# Patient Record
Sex: FEMALE | Race: WHITE | NOT HISPANIC OR LATINO | URBAN - METROPOLITAN AREA
[De-identification: names, ages, dates, MRNs, and addresses within clinical notes are randomized per-mention and may not be internally consistent; named-entity substitution may affect disease eponyms.]

---

## 2017-06-19 ENCOUNTER — HOSPITAL ENCOUNTER (EMERGENCY)
Facility: HOSPITAL | Age: 42
Discharge: LEFT BEFORE DISCHARGE | End: 2017-06-19
Attending: EMERGENCY MEDICINE | Admitting: EMERGENCY MEDICINE

## 2017-06-19 VITALS
OXYGEN SATURATION: 99 % | RESPIRATION RATE: 18 BRPM | BODY MASS INDEX: 18.22 KG/M2 | HEART RATE: 104 BPM | WEIGHT: 99 LBS | TEMPERATURE: 97.4 F | HEIGHT: 62 IN | SYSTOLIC BLOOD PRESSURE: 98 MMHG | DIASTOLIC BLOOD PRESSURE: 40 MMHG

## 2017-06-19 DIAGNOSIS — R10.9 ABDOMINAL PAIN, UNSPECIFIED LOCATION: Primary | ICD-10-CM

## 2017-06-19 PROCEDURE — 99281 EMR DPT VST MAYX REQ PHY/QHP: CPT

## 2017-06-19 PROCEDURE — 99282 EMERGENCY DEPT VISIT SF MDM: CPT | Performed by: EMERGENCY MEDICINE

## 2017-06-19 RX ORDER — IBUPROFEN 800 MG/1
800 TABLET ORAL EVERY 6 HOURS PRN
COMMUNITY

## 2017-06-19 RX ORDER — DOXYCYCLINE HYCLATE 100 MG/1
100 CAPSULE ORAL 2 TIMES DAILY
COMMUNITY

## 2017-06-19 RX ORDER — PANTOPRAZOLE SODIUM 40 MG/1
40 TABLET, DELAYED RELEASE ORAL DAILY
COMMUNITY

## 2017-06-19 RX ORDER — CYCLOBENZAPRINE HCL 5 MG
10 TABLET ORAL 3 TIMES DAILY PRN
COMMUNITY

## 2017-06-19 RX ORDER — PROMETHAZINE HYDROCHLORIDE 12.5 MG/1
25 TABLET ORAL EVERY 6 HOURS PRN
COMMUNITY

## 2017-06-19 RX ORDER — MONTELUKAST SODIUM 10 MG/1
10 TABLET ORAL NIGHTLY
COMMUNITY

## 2017-06-19 RX ORDER — CLORAZEPATE DIPOTASSIUM 15 MG/1
15 TABLET ORAL 2 TIMES DAILY
COMMUNITY

## 2017-06-19 NOTE — ED PROVIDER NOTES
Subjective   History of Present Illness  History of Present Illness    Chief complaint: Lower abdominal pain    Location: Right side of abdomen    Quality/Severity:  Mild to moderate, worse when she stands up    Timing/Onset/Duration: It started on June 17, 2017 after a flight here.    Modifying Factors: It hurts to stand, feels better to remain still    Associated Symptoms: Fever or chills.  No nausea or vomiting.  The patient has no other complaints.  No headache.  No cough sore throat earache or nasal congestion.  No chest pain or shortness of breath.  No diarrhea or burning when she urinates.  The patient has been having normal bowel movements.    Narrative: This 41-year-old white female had a vaginal delivery 3 weeks ago.  Right ear by plane on June 17, 2017.  After that she noted that when she stood up she has pain in the right lower abdomen that was worse on standing.  The patient denies any fever or chills.  No nausea or vomiting.  The patient has been having normal bowel movements.    PCP:  No Local Provider      Review of Systems   Constitutional: Negative for chills and fever.   HENT: Negative for congestion, ear pain and sore throat.    Respiratory: Negative for cough, chest tightness, shortness of breath and wheezing.    Cardiovascular: Negative for chest pain, palpitations and leg swelling.   Gastrointestinal: Positive for abdominal pain. Negative for blood in stool, constipation, diarrhea, nausea and vomiting.   Genitourinary: Negative for decreased urine volume, dysuria, flank pain, frequency, genital sores, hematuria, menstrual problem, pelvic pain, urgency, vaginal bleeding, vaginal discharge and vaginal pain.   Musculoskeletal: Negative for back pain.   Skin: Negative for rash and wound.   Neurological: Negative for weakness and headaches.   Hematological: Negative for adenopathy.   Psychiatric/Behavioral: Negative for confusion.        Medication List      ASK your doctor about these medications           AMBIEN PO       clorazepate 15 MG tablet   Commonly known as:  TRANXENE       docusate sodium 50 MG capsule   Commonly known as:  COLACE       doxycycline 100 MG capsule   Commonly known as:  VIBRAMYCIN       FLEXERIL 5 MG tablet   Generic drug:  cyclobenzaprine       * ibuprofen 100 MG/5ML suspension   Commonly known as:  ADVIL,MOTRIN       * ibuprofen 800 MG tablet   Commonly known as:  ADVIL,MOTRIN       montelukast 10 MG tablet   Commonly known as:  SINGULAIR       pantoprazole 40 MG EC tablet   Commonly known as:  PROTONIX       promethazine 12.5 MG tablet   Commonly known as:  PHENERGAN       VYVANSE 30 MG capsule   Generic drug:  lisdexamfetamine       * Notice:  This list has 2 medication(s) that are the same as other   medications prescribed for you. Read the directions carefully, and ask   your doctor or other care provider to review them with you.        Past Medical History:   Diagnosis Date   • Anxiety    • Attention deficit hyperactivity disorder (ADHD)    • Obsessive compulsive disorder        Allergies   Allergen Reactions   • Sulfa Antibiotics        Past Surgical History:   Procedure Laterality Date   • CERVICAL SPINE SURGERY     •  SECTION     • FINGER SURGERY     • HYSTERECTOMY         History reviewed. No pertinent family history.    Social History     Social History   • Marital status: Single     Spouse name: N/A   • Number of children: N/A   • Years of education: N/A     Social History Main Topics   • Smoking status: Current Every Day Smoker     Packs/day: 0.50     Types: Cigarettes   • Smokeless tobacco: None   • Alcohol use Yes   • Drug use: No   • Sexual activity: Not Asked     Other Topics Concern   • None     Social History Narrative   • None           Objective   Physical Exam   Constitutional: She is oriented to person, place, and time. She appears well-developed and well-nourished. No distress.   ED Triage Vitals:  Temp: 97.4 °F (36.3 °C) (17 1125)  Heart Rate:  104 (06/19/17 1125)  Resp: 18 (06/19/17 1125)  BP: 98/40 (06/19/17 1125)  SpO2: 99 % (06/19/17 1125)  Temp src: Oral (06/19/17 1125)  Heart Rate Source: Monitor (06/19/17 1125)  Patient Position: Sitting (06/19/17 1125)  BP Location: Left arm (06/19/17 1125)  FiO2 (%): n/a    The patient's vitals were reviewed by me.  Unless otherwise noted they are within normal limits.     HENT:   Head: Normocephalic and atraumatic.   Right Ear: External ear normal.   Left Ear: External ear normal.   Nose: Nose normal.   Mouth/Throat: Oropharynx is clear and moist.   Eyes: Conjunctivae and EOM are normal. Pupils are equal, round, and reactive to light. Right eye exhibits no discharge. Left eye exhibits no discharge. No scleral icterus.   Neck: Normal range of motion. Neck supple. No JVD present. No tracheal deviation present. No thyromegaly present.   Cardiovascular: Normal rate, regular rhythm, normal heart sounds and intact distal pulses.  Exam reveals no gallop and no friction rub.    No murmur heard.  Pulmonary/Chest: Effort normal and breath sounds normal. No stridor. No respiratory distress. She has no wheezes. She has no rales. She exhibits no tenderness.   Abdominal: Soft. Bowel sounds are normal. She exhibits no distension (mild right lower abdominal tenderness) and no mass. There is tenderness. There is no rebound and no guarding. No hernia.   Musculoskeletal: Normal range of motion. She exhibits no edema or deformity.   Lymphadenopathy:     She has no cervical adenopathy.   Neurological: She is alert and oriented to person, place, and time.   Skin: Skin is warm and dry. No rash noted. She is not diaphoretic. No erythema. No pallor.   Psychiatric: Her behavior is normal.   Nursing note and vitals reviewed.      Procedures         ED Course  ED Course                  MDM  No orders to display     Labs Reviewed   BASIC METABOLIC PANEL   URINALYSIS W/ CULTURE IF INDICATED   CBC WITH AUTO DIFFERENTIAL   CBC AND DIFFERENTIAL     Narrative:     The following orders were created for panel order CBC & Differential.  Procedure                               Abnormality         Status                     ---------                               -----------         ------                     CBC Auto Differential[[id number]]                                                         Please view results for these tests on the individual orders.     No results found.     11:44 AM, 06/19/17:  Patient is leaving for workup and treatment is complete.  She states that she has to go home and take care of her child.  She cannot wait for further workup and evaluation.  She understands the risk and benefits.    11:45 AM, 06/19/17:  The [facility] report was reviewed by me.  The report number is [id number].    Final diagnoses:   None         ED Medications:  Medications - No data to display    New Medications:     Medication List      ASK your doctor about these medications          AMBIEN PO       clorazepate 15 MG tablet   Commonly known as:  TRANXENE       docusate sodium 50 MG capsule   Commonly known as:  COLACE       doxycycline 100 MG capsule   Commonly known as:  VIBRAMYCIN       FLEXERIL 5 MG tablet   Generic drug:  cyclobenzaprine       * ibuprofen 100 MG/5ML suspension   Commonly known as:  ADVIL,MOTRIN       * ibuprofen 800 MG tablet   Commonly known as:  ADVIL,MOTRIN       montelukast 10 MG tablet   Commonly known as:  SINGULAIR       pantoprazole 40 MG EC tablet   Commonly known as:  PROTONIX       promethazine 12.5 MG tablet   Commonly known as:  PHENERGAN       VYVANSE 30 MG capsule   Generic drug:  lisdexamfetamine       * Notice:  This list has 2 medication(s) that are the same as other   medications prescribed for you. Read the directions carefully, and ask   your doctor or other care provider to review them with you.        Stopped Medications:     Medication List      ASK your doctor about these medications          AMBIEN PO       clorazepate  15 MG tablet   Commonly known as:  TRANXENE       docusate sodium 50 MG capsule   Commonly known as:  COLACE       doxycycline 100 MG capsule   Commonly known as:  VIBRAMYCIN       FLEXERIL 5 MG tablet   Generic drug:  cyclobenzaprine       * ibuprofen 100 MG/5ML suspension   Commonly known as:  ADVIL,MOTRIN       * ibuprofen 800 MG tablet   Commonly known as:  ADVIL,MOTRIN       montelukast 10 MG tablet   Commonly known as:  SINGULAIR       pantoprazole 40 MG EC tablet   Commonly known as:  PROTONIX       promethazine 12.5 MG tablet   Commonly known as:  PHENERGAN       VYVANSE 30 MG capsule   Generic drug:  lisdexamfetamine       * Notice:  This list has 2 medication(s) that are the same as other   medications prescribed for you. Read the directions carefully, and ask   your doctor or other care provider to review them with you.          Final diagnoses:   Abdominal pain, unspecified location            Lavon Doty MD  06/19/17 [id number]       Lavon Doty MD  06/26/17 [id number]